# Patient Record
Sex: MALE | Race: WHITE | NOT HISPANIC OR LATINO | URBAN - METROPOLITAN AREA
[De-identification: names, ages, dates, MRNs, and addresses within clinical notes are randomized per-mention and may not be internally consistent; named-entity substitution may affect disease eponyms.]

---

## 2021-07-15 ENCOUNTER — INPATIENT (INPATIENT)
Facility: HOSPITAL | Age: 36
LOS: 1 days | Discharge: HOME | DRG: 603 | End: 2021-07-17
Attending: STUDENT IN AN ORGANIZED HEALTH CARE EDUCATION/TRAINING PROGRAM | Admitting: HOSPITALIST
Payer: COMMERCIAL

## 2021-07-15 VITALS
WEIGHT: 199.96 LBS | TEMPERATURE: 98 F | SYSTOLIC BLOOD PRESSURE: 122 MMHG | RESPIRATION RATE: 16 BRPM | HEART RATE: 75 BPM | OXYGEN SATURATION: 100 % | DIASTOLIC BLOOD PRESSURE: 78 MMHG

## 2021-07-15 LAB
ALBUMIN SERPL ELPH-MCNC: 4.5 G/DL — SIGNIFICANT CHANGE UP (ref 3.3–5)
ALP SERPL-CCNC: 78 U/L — SIGNIFICANT CHANGE UP (ref 40–120)
ALT FLD-CCNC: 47 U/L — HIGH (ref 10–45)
ANION GAP SERPL CALC-SCNC: 15 MMOL/L — SIGNIFICANT CHANGE UP (ref 5–17)
AST SERPL-CCNC: 34 U/L — SIGNIFICANT CHANGE UP (ref 10–40)
BASOPHILS # BLD AUTO: 0.04 K/UL — SIGNIFICANT CHANGE UP (ref 0–0.2)
BASOPHILS NFR BLD AUTO: 0.5 % — SIGNIFICANT CHANGE UP (ref 0–2)
BILIRUB SERPL-MCNC: 0.7 MG/DL — SIGNIFICANT CHANGE UP (ref 0.2–1.2)
BUN SERPL-MCNC: 15 MG/DL — SIGNIFICANT CHANGE UP (ref 7–23)
CALCIUM SERPL-MCNC: 9.7 MG/DL — SIGNIFICANT CHANGE UP (ref 8.4–10.5)
CHLORIDE SERPL-SCNC: 102 MMOL/L — SIGNIFICANT CHANGE UP (ref 96–108)
CO2 SERPL-SCNC: 24 MMOL/L — SIGNIFICANT CHANGE UP (ref 22–31)
CREAT SERPL-MCNC: 1.05 MG/DL — SIGNIFICANT CHANGE UP (ref 0.5–1.3)
EOSINOPHIL # BLD AUTO: 0.16 K/UL — SIGNIFICANT CHANGE UP (ref 0–0.5)
EOSINOPHIL NFR BLD AUTO: 1.8 % — SIGNIFICANT CHANGE UP (ref 0–6)
GLUCOSE SERPL-MCNC: 94 MG/DL — SIGNIFICANT CHANGE UP (ref 70–99)
HCT VFR BLD CALC: 44.5 % — SIGNIFICANT CHANGE UP (ref 39–50)
HGB BLD-MCNC: 15.1 G/DL — SIGNIFICANT CHANGE UP (ref 13–17)
IMM GRANULOCYTES NFR BLD AUTO: 0.8 % — SIGNIFICANT CHANGE UP (ref 0–1.5)
LACTATE SERPL-SCNC: 1.4 MMOL/L — SIGNIFICANT CHANGE UP (ref 0.5–2)
LYMPHOCYTES # BLD AUTO: 1.96 K/UL — SIGNIFICANT CHANGE UP (ref 1–3.3)
LYMPHOCYTES # BLD AUTO: 22.3 % — SIGNIFICANT CHANGE UP (ref 13–44)
MCHC RBC-ENTMCNC: 30.9 PG — SIGNIFICANT CHANGE UP (ref 27–34)
MCHC RBC-ENTMCNC: 33.9 GM/DL — SIGNIFICANT CHANGE UP (ref 32–36)
MCV RBC AUTO: 91.2 FL — SIGNIFICANT CHANGE UP (ref 80–100)
MONOCYTES # BLD AUTO: 0.63 K/UL — SIGNIFICANT CHANGE UP (ref 0–0.9)
MONOCYTES NFR BLD AUTO: 7.2 % — SIGNIFICANT CHANGE UP (ref 2–14)
NEUTROPHILS # BLD AUTO: 5.92 K/UL — SIGNIFICANT CHANGE UP (ref 1.8–7.4)
NEUTROPHILS NFR BLD AUTO: 67.4 % — SIGNIFICANT CHANGE UP (ref 43–77)
NRBC # BLD: 0 /100 WBCS — SIGNIFICANT CHANGE UP (ref 0–0)
PLATELET # BLD AUTO: 328 K/UL — SIGNIFICANT CHANGE UP (ref 150–400)
POTASSIUM SERPL-MCNC: 4.2 MMOL/L — SIGNIFICANT CHANGE UP (ref 3.5–5.3)
POTASSIUM SERPL-SCNC: 4.2 MMOL/L — SIGNIFICANT CHANGE UP (ref 3.5–5.3)
PROT SERPL-MCNC: 8.6 G/DL — HIGH (ref 6–8.3)
RBC # BLD: 4.88 M/UL — SIGNIFICANT CHANGE UP (ref 4.2–5.8)
RBC # FLD: 11.9 % — SIGNIFICANT CHANGE UP (ref 10.3–14.5)
SARS-COV-2 RNA SPEC QL NAA+PROBE: NEGATIVE — SIGNIFICANT CHANGE UP
SODIUM SERPL-SCNC: 141 MMOL/L — SIGNIFICANT CHANGE UP (ref 135–145)
WBC # BLD: 8.78 K/UL — SIGNIFICANT CHANGE UP (ref 3.8–10.5)
WBC # FLD AUTO: 8.78 K/UL — SIGNIFICANT CHANGE UP (ref 3.8–10.5)

## 2021-07-15 PROCEDURE — 99223 1ST HOSP IP/OBS HIGH 75: CPT | Mod: GC

## 2021-07-15 PROCEDURE — 73080 X-RAY EXAM OF ELBOW: CPT | Mod: 26,LT

## 2021-07-15 PROCEDURE — 99285 EMERGENCY DEPT VISIT HI MDM: CPT

## 2021-07-15 RX ORDER — ACETAMINOPHEN 500 MG
650 TABLET ORAL EVERY 6 HOURS
Refills: 0 | Status: DISCONTINUED | OUTPATIENT
Start: 2021-07-15 | End: 2021-07-17

## 2021-07-15 RX ORDER — VANCOMYCIN HCL 1 G
1000 VIAL (EA) INTRAVENOUS ONCE
Refills: 0 | Status: COMPLETED | OUTPATIENT
Start: 2021-07-15 | End: 2021-07-15

## 2021-07-15 RX ADMIN — Medication 250 MILLIGRAM(S): at 20:24

## 2021-07-15 NOTE — H&P ADULT - HISTORY OF PRESENT ILLNESS
Aguila Soria is a 37 yo male with h/o HTN, presented to ER with left arm redness and swelling . Pt reports he had swelling to his left elbow which he noted on Friday which had resolved over the weekend. Patient noted to have redness to his forearm on Monday. Patient stated that his arm did not hurt but started to getting worried about the swelling and redness after which he went to the PMD, after he prescribed Keflex initially than next day Bactrim was add-on since redness significantly increased in size. Pt denies any pain to his left elbow or arm, denies injury to his arm, denies fever or chills, has NROM and normal sensation. Pt was recommended to go to the hospital because despite him taking PO abx. As of now patient denies any fever, chills, Trauma, fall, history of travelling, tick bite,  chest pain , palpitations, nausea, vomiting, abdominal pain, diarrhea, constipation, dysuria or recent history of ill contacts    ED Course  Vitals: T: 97.9F   HR: 75/min    BP: 122/78    RR: 16/min    SPO2: 100% on RA  ED Interventions: Vancomycin 1g IV  Aguila Soria is a 37 yo male with h/o HTN, presented to ER with left arm redness and swelling . Pt reports he had swelling to his left elbow which he noted on Friday which had resolved over the weekend. Patient noted to have redness to his forearm on Monday. Patient stated that his arm did not hurt but started to getting worried about the swelling and redness after which he went to the PMD, after he prescribed Keflex initially than next day Bactrim was add-on since redness significantly increased in size. Pt denies any pain to his left elbow or arm, denies injury to his arm, denies fever or chills, has NROM and normal sensation. Pt was recommended to go to the hospital because despite him taking PO abx. As of now patient denies any fever, chills, Trauma, fall, history of travelling, tick bite,  chest pain , palpitations, nausea, vomiting, abdominal pain, diarrhea, constipation, dysuria or recent history of ill contacts.    ED Course  Vitals: T: 97.9F   HR: 75/min    BP: 122/78    RR: 16/min    SPO2: 100% on RA  ED Interventions: Vancomycin 1g IV  Aguila Soria is a 35 yo male with h/o HTN, presented to ER with left arm redness and swelling . Pt reports he had swelling to his left elbow which he noted on Friday which had resolved over the weekend. Patient noted to have redness to his forearm on Monday. Patient stated that his arm did not hurt but started to getting worried about the swelling and redness after which he went to the PMD, after he prescribed Keflex initially than next day Bactrim was add-on since redness significantly increased in size. Pt denies any pain to his left elbow or arm, denies injury to his arm, denies fever or chills, has NROM and normal sensation. Pt endorses doing planks daily on a yoga mat which he never cleaned.  As of now patient denies any fever, chills, Trauma, fall, history of travelling, tick bite,  chest pain , palpitations, nausea, vomiting, abdominal pain, diarrhea, constipation, dysuria or recent history of ill contacts.    ED Course  Vitals: T: 97.9F   HR: 75/min    BP: 122/78    RR: 16/min    SPO2: 100% on RA  ED Interventions: Vancomycin 1g IV

## 2021-07-15 NOTE — H&P ADULT - ASSESSMENT
Aguila Soria is 35 YO M with PMH of HTN  Aguila Soria is a 35 yo male with h/o HTN, presented to ER with left arm redness and swelling . Pt reports he had swelling to his left elbow which he noted on Friday which had resolved over the weekend. Patient noted to have redness to his forearm on Monday which gradually worsened over time. Patient is vitally stable with no elevation of wbc as of now however has elevated levels of ESR and CRP and is being admitted to general medical floor for cellulitis. Aguila Soria is a 37 yo male with h/o HTN, presented to ER with left arm redness and swelling . Pt reports he had swelling to his left elbow which he noted on Friday which had resolved over the weekend. Patient noted to have redness to his forearm on Monday which gradually worsened over time. Patient is vitally stable with no elevation of wbc as of now however has elevated levels of ESR and CRP and is being admitted to general medical floor. Patient endorsed doing planks on yoga mat which he stated he did not clean and could be possible source of his symptoms.

## 2021-07-15 NOTE — ED PROVIDER NOTE - CLINICAL SUMMARY MEDICAL DECISION MAKING FREE TEXT BOX
37 yo male with h/o HTN, presented to ER with left arm redness and swelling . Pt reports he had swelling to his left elbow which he noted on Friday which had resolved over the weekend. Patient noted to have redness to his forearm on Monday which gradually worsened over time. Patient is afebrile, VSS, has elevated ESR and CRP and no leukocytosis. Pt has NROM to his  left elbow and no elbow joint tenderness on exam. Will admit for IV abx , since symptoms become worse despite pt taking PO abx.

## 2021-07-15 NOTE — H&P ADULT - NSHPPHYSICALEXAM_GEN_ALL_CORE
VITAL SIGNS:  T(C): 36.7 (07-15-21 @ 23:54), Max: 36.8 (07-15-21 @ 22:34)  HR: 68 (07-15-21 @ 23:54) (68 - 88)  BP: 120/75 (07-15-21 @ 23:54) (119/67 - 122/78)  RR: 18 (07-15-21 @ 23:54) (16 - 18)  SpO2: 97% (07-15-21 @ 23:54) (97% - 100%)    PHYSICAL EXAM:  Constitutional: WDWN, comfortable in bed; NAD  Neurologic: AAOx3; CNII-XII grossly intact; no focal deficits  HEENT: NC/AT; clear conjunctiva, anicteric sclera; no nasal discharge; no oropharyngeal erythema or exudates, MMM  Neck: supple; no JVD or thyromegaly, no cervical, post-auricular or supraclavicular lymphadenopathy  Respiratory: CTA B/L, no wheezes/rales/rhonchi, no diminished breath sounds, no increased work of breathing or accessory muscle use  Cardiac: +S1/S2, no murmurs/rubs/gallops, RRR  Gastrointestinal: abdomen soft, NT/ND; no rebound or guarding, no hepatosplenomegaly; +BSx4  Genitourinary: no suprapubic tenderness, no CVA tenderness b/l  Back: spine midline, no bony tenderness or step-offs  Extremities: WWP, no clubbing or cyanosis; no peripheral edema b/l  Musculoskeletal: NROM x4; no joint swelling, tenderness or erythema  Vascular: 2+ radial, femoral, DP/PT pulses B/L  Dermatologic: L forearm redness and swelling noted, skin warm, dry and intact; no rashes, wounds, or scars  Lymphatic: no submandibular or cervical LAD  Psychiatric: affect and characteristics of appearance, verbalizations, behaviors are appropriate VITAL SIGNS:  T(C): 36.7 (07-15-21 @ 23:54), Max: 36.8 (07-15-21 @ 22:34)  HR: 68 (07-15-21 @ 23:54) (68 - 88)  BP: 120/75 (07-15-21 @ 23:54) (119/67 - 122/78)  RR: 18 (07-15-21 @ 23:54) (16 - 18)  SpO2: 97% (07-15-21 @ 23:54) (97% - 100%)    PHYSICAL EXAM:  Constitutional: WDWN, comfortable in bed; NAD  Neurologic: AAOx3; CNII-XII grossly intact; no focal deficits  HEENT: NC/AT; clear conjunctiva, anicteric sclera; no nasal discharge; no oropharyngeal erythema or exudates, MMM  Neck: supple; no JVD or thyromegaly, no cervical, post-auricular or supraclavicular lymphadenopathy  Respiratory: CTA B/L, no wheezes/rales/rhonchi, no diminished breath sounds, no increased work of breathing or accessory muscle use  Cardiac: +S1/S2, no murmurs/rubs/gallops, RRR  Gastrointestinal: abdomen soft, NT/ND; no rebound or guarding, no hepatosplenomegaly; +BSx4  Genitourinary: no suprapubic tenderness, no CVA tenderness b/l  Back: spine midline, no bony tenderness or step-offs  Extremities: WWP, no clubbing or cyanosis; no peripheral edema b/l  Musculoskeletal: NROM x4; no joint swelling, tenderness or erythema  Vascular: 2+ radial, femoral, DP/PT pulses B/L  Dermatologic: L forearm redness and swelling noted with abrasion over the left elbow, skin warm, dry and intact; no rashes, wounds, or scars  Lymphatic: no submandibular or cervical LAD  Psychiatric: affect and characteristics of appearance, verbalizations, behaviors are appropriate

## 2021-07-15 NOTE — ED ADULT NURSE NOTE - OBJECTIVE STATEMENT
pt is 36y male, here for LT arm swelling and redness since Sunday, reports it started as a "bump" on his elbow and then spread out on his arm, denies any fevers, n/v/d, started on abx on Monday with no improvement, LT arm with marked redness and some swelling but no open wounds or drainage, NAD present

## 2021-07-15 NOTE — H&P ADULT - NSHPLABSRESULTS_GEN_ALL_CORE
LABS:                        15.1   8.78  )-----------( 328      ( 15 Jul 2021 20:22 )             44.5     07-15    141  |  102  |  15  ----------------------------<  94  4.2   |  24  |  1.05    Ca    9.7      15 Jul 2021 20:22    TPro  8.6<H>  /  Alb  4.5  /  TBili  0.7  /  DBili  x   /  AST  34  /  ALT  47<H>  /  AlkPhos  78  07-15        CAPILLARY BLOOD GLUCOSE                              I & O Summary:      Microbiology:        RADIOLOGY, EKG AND ADDITIONAL TESTS: Reviewed.

## 2021-07-15 NOTE — ED PROVIDER NOTE - SKIN, MLM
Skin normal color for race, warm, dry and intact. No evidence of rash. left forearm erythema, increased warmth to touch extending above the elbow joint, and beyond surgical pen markings placed yesterday

## 2021-07-15 NOTE — ED PROVIDER NOTE - INTERNATIONAL TRAVEL
SOB may be due to acute COPD exacerbation   Solumedrol ordered - likely start tapering tomorrow.   inhalers ordered - pulmicort/ duoneb No active symptoms.  Currently with paced rhythm making ECG interpretation not helpful. Continue antiplatelet therapy. SOB may be due to exertion vs COPD.   Taper steroids slowly.  inhalers ordered - pulmicort/ duoneb  Will get extra nebulizer now and rest.  If no improvement after lunch will repeat CXR. Stable; continue antiplatelet therapy. Taper steroids slowly.  inhalers as ordered - pulmicort/ duoneb No No active symptoms.  Currently with paced rhythm making ECG interpretation not helpful. Continue antiplatlet therapy.

## 2021-07-15 NOTE — H&P ADULT - ATTENDING COMMENTS
A 35yo M with no significant PMH presents with pain/swelling over left elbow w/ overlying erythema that has progressed down arm for 4-5days    #Celultis vs septic bursitis: seen by PCP and prescribed Keflex 4 days ago and switched to Bactrim next day; now with increased erythema down arm. No fevers/chills; no leukocytosis, mild ESR/CRP. s/p vancomycin 1gram in ED. Ortho consulted; however likely no aspiration needed. No RF to MRSA. c/w cefazolin for now A 35yo M with no significant PMH presents with pain/swelling over left elbow w/ overlying erythema that has progressed down arm for 4-5days    #Celultis vs septic bursitis: seen by PCP and prescribed Keflex 4 days ago and switched to Bactrim next day; now with increased erythema down arm. No fevers/chills; no leukocytosis, mild ESR/CRP. s/p vancomycin 1gram in ED. Ortho consulted; No RF to MRSA. c/w cefazolin for now A 35yo M with no significant PMH presents with pain/swelling over left elbow w/ overlying erythema that has progressed down arm for 4-5days    #Celultis vs septic bursitis: seen by PCP and prescribed Keflex 4 days ago and switched to Bactrim next day; now with increased erythema down arm. No fevers/chills; no leukocytosis, mild ESR/CRP. s/p vancomycin 1gram in ED. f/up ortho recs; No RF to MRSA. c/w cefazolin for now

## 2021-07-15 NOTE — ED PROVIDER NOTE - OBJECTIVE STATEMENT
35 yo male with h/o HTN, present to ER with left arm cellulitis. Pt reports he had swelling to his left elbow last week which had resolved over the weekend. Pt noted redness to his forearm on Monday. Seen by PMD and prescribed Keflex initially than next day Bactrim was add-on since redness significantly increased in size. Pt denies any pain to his left elbow or arm, denies injury to his arm, denies fever or chills, has NROM and normal sensation. Pt was recommended to go to the hospital because despite him taking PO abx cellulitis has been significantly worsen.

## 2021-07-15 NOTE — H&P ADULT - PROBLEM SELECTOR PLAN 1
VSS  Elevated ESR, CRP  Plan:   - If febrile consider septic WYMAN   - c/w Clindamycin   - f/u Xray of the L forearm VSS  Elevated ESR, CRP  Plan:   - c/w Clindamycin   - f/u Blood cultures  - f/u Xray of the L forearm VSS  Elevated ESR, CRP  Unclear if septic bursitis or septic bursitis associated with cellulitis  Was prescribed Keflex and bactrim by PMD but took only total of 2-3 doses  Plan:   - c/w Cefazolin 500mg IV q8   - f/u Blood cultures  - f/u Xray of the L forearm VSS  Elevated ESR, CRP  Unclear if septic bursitis or septic bursitis associated with cellulitis  Was prescribed Keflex and bactrim by PMD but took only total of 2-3 doses  Plan:   - c/w Cefazolin 500mg IV q8   - f/u Blood cultures  - f/u Xray of the L forearm  - Ortho consulted, f/u recs

## 2021-07-15 NOTE — ED ADULT TRIAGE NOTE - CHIEF COMPLAINT QUOTE
pt complaining of left arm pain redness streaking up the arm since last Friday, started Keflex on Monday noted skin pen marking redness appears outside lines drawn

## 2021-07-15 NOTE — H&P ADULT - PROBLEM SELECTOR PLAN 3
F: None  E: replete to K>4, Mg>2, Phos>2.5  N: Regular diet   Ppx: none, ambulates     Code Status: Full     Dispo:  Regional medical floor

## 2021-07-16 DIAGNOSIS — R63.8 OTHER SYMPTOMS AND SIGNS CONCERNING FOOD AND FLUID INTAKE: ICD-10-CM

## 2021-07-16 DIAGNOSIS — L03.114 CELLULITIS OF LEFT UPPER LIMB: ICD-10-CM

## 2021-07-16 DIAGNOSIS — I10 ESSENTIAL (PRIMARY) HYPERTENSION: ICD-10-CM

## 2021-07-16 LAB
ALBUMIN SERPL ELPH-MCNC: 4.3 G/DL — SIGNIFICANT CHANGE UP (ref 3.3–5)
ALP SERPL-CCNC: 81 U/L — SIGNIFICANT CHANGE UP (ref 40–120)
ALT FLD-CCNC: 45 U/L — SIGNIFICANT CHANGE UP (ref 10–45)
ANION GAP SERPL CALC-SCNC: 10 MMOL/L — SIGNIFICANT CHANGE UP (ref 5–17)
AST SERPL-CCNC: 31 U/L — SIGNIFICANT CHANGE UP (ref 10–40)
BASOPHILS # BLD AUTO: 0.04 K/UL — SIGNIFICANT CHANGE UP (ref 0–0.2)
BASOPHILS NFR BLD AUTO: 0.5 % — SIGNIFICANT CHANGE UP (ref 0–2)
BILIRUB SERPL-MCNC: 0.9 MG/DL — SIGNIFICANT CHANGE UP (ref 0.2–1.2)
BUN SERPL-MCNC: 12 MG/DL — SIGNIFICANT CHANGE UP (ref 7–23)
CALCIUM SERPL-MCNC: 10.1 MG/DL — SIGNIFICANT CHANGE UP (ref 8.4–10.5)
CHLORIDE SERPL-SCNC: 101 MMOL/L — SIGNIFICANT CHANGE UP (ref 96–108)
CO2 SERPL-SCNC: 27 MMOL/L — SIGNIFICANT CHANGE UP (ref 22–31)
CREAT SERPL-MCNC: 1.05 MG/DL — SIGNIFICANT CHANGE UP (ref 0.5–1.3)
EOSINOPHIL # BLD AUTO: 0.15 K/UL — SIGNIFICANT CHANGE UP (ref 0–0.5)
EOSINOPHIL NFR BLD AUTO: 2.1 % — SIGNIFICANT CHANGE UP (ref 0–6)
GLUCOSE SERPL-MCNC: 91 MG/DL — SIGNIFICANT CHANGE UP (ref 70–99)
HCT VFR BLD CALC: 46.2 % — SIGNIFICANT CHANGE UP (ref 39–50)
HGB BLD-MCNC: 15 G/DL — SIGNIFICANT CHANGE UP (ref 13–17)
IMM GRANULOCYTES NFR BLD AUTO: 1 % — SIGNIFICANT CHANGE UP (ref 0–1.5)
LYMPHOCYTES # BLD AUTO: 1.74 K/UL — SIGNIFICANT CHANGE UP (ref 1–3.3)
LYMPHOCYTES # BLD AUTO: 23.9 % — SIGNIFICANT CHANGE UP (ref 13–44)
MCHC RBC-ENTMCNC: 29.8 PG — SIGNIFICANT CHANGE UP (ref 27–34)
MCHC RBC-ENTMCNC: 32.5 GM/DL — SIGNIFICANT CHANGE UP (ref 32–36)
MCV RBC AUTO: 91.7 FL — SIGNIFICANT CHANGE UP (ref 80–100)
MONOCYTES # BLD AUTO: 0.7 K/UL — SIGNIFICANT CHANGE UP (ref 0–0.9)
MONOCYTES NFR BLD AUTO: 9.6 % — SIGNIFICANT CHANGE UP (ref 2–14)
NEUTROPHILS # BLD AUTO: 4.58 K/UL — SIGNIFICANT CHANGE UP (ref 1.8–7.4)
NEUTROPHILS NFR BLD AUTO: 62.9 % — SIGNIFICANT CHANGE UP (ref 43–77)
NRBC # BLD: 0 /100 WBCS — SIGNIFICANT CHANGE UP (ref 0–0)
PLATELET # BLD AUTO: 358 K/UL — SIGNIFICANT CHANGE UP (ref 150–400)
POTASSIUM SERPL-MCNC: 4.4 MMOL/L — SIGNIFICANT CHANGE UP (ref 3.5–5.3)
POTASSIUM SERPL-SCNC: 4.4 MMOL/L — SIGNIFICANT CHANGE UP (ref 3.5–5.3)
PROT SERPL-MCNC: 8 G/DL — SIGNIFICANT CHANGE UP (ref 6–8.3)
RBC # BLD: 5.04 M/UL — SIGNIFICANT CHANGE UP (ref 4.2–5.8)
RBC # FLD: 12 % — SIGNIFICANT CHANGE UP (ref 10.3–14.5)
SODIUM SERPL-SCNC: 138 MMOL/L — SIGNIFICANT CHANGE UP (ref 135–145)
WBC # BLD: 7.28 K/UL — SIGNIFICANT CHANGE UP (ref 3.8–10.5)
WBC # FLD AUTO: 7.28 K/UL — SIGNIFICANT CHANGE UP (ref 3.8–10.5)

## 2021-07-16 PROCEDURE — 99233 SBSQ HOSP IP/OBS HIGH 50: CPT | Mod: GC

## 2021-07-16 RX ORDER — CEFAZOLIN SODIUM 1 G
500 VIAL (EA) INJECTION EVERY 8 HOURS
Refills: 0 | Status: DISCONTINUED | OUTPATIENT
Start: 2021-07-16 | End: 2021-07-17

## 2021-07-16 RX ORDER — LISINOPRIL 2.5 MG/1
20 TABLET ORAL DAILY
Refills: 0 | Status: DISCONTINUED | OUTPATIENT
Start: 2021-07-16 | End: 2021-07-17

## 2021-07-16 RX ADMIN — Medication 100 MILLIGRAM(S): at 06:26

## 2021-07-16 RX ADMIN — Medication 100 MILLIGRAM(S): at 14:28

## 2021-07-16 RX ADMIN — Medication 100 MILLIGRAM(S): at 22:05

## 2021-07-16 RX ADMIN — LISINOPRIL 20 MILLIGRAM(S): 2.5 TABLET ORAL at 09:22

## 2021-07-16 NOTE — CONSULT NOTE ADULT - SUBJECTIVE AND OBJECTIVE BOX
Orthopaedic Surgery Consult Note    For Surgeon: Dr. Bird    HPI:  Aguila Soria is a 37 yo male with h/o HTN, presented to ER with left arm redness and swelling . Pt reports he had swelling to his left elbow which he noted on Friday which had resolved over the weekend. Patient noted to have redness to his forearm on Monday. Patient stated that his arm did not hurt but started to getting worried about the swelling and redness after which he went to the PMD, after he prescribed Keflex initially than next day Bactrim was add-on since redness significantly increased in size. Pt denies any pain to his left elbow or arm, denies injury to his arm, denies fever or chills, has NROM and normal sensation. Pt endorses doing planks daily on a yoga mat which he never cleaned.  As of now patient denies any fever, chills, Trauma, fall, history of travelling, tick bite,  chest pain , palpitations, nausea, vomiting, abdominal pain, diarrhea, constipation, dysuria or recent history of ill contacts.    Ortho consulted to evaluate for septic bursitis vs. cellulitis vs. septic arthritis    Allergies    No Known Allergies    Intolerances      PAST MEDICAL & SURGICAL HISTORY:  Hypertension      MEDICATIONS  (STANDING):  ceFAZolin   IVPB 500 milliGRAM(s) IV Intermittent every 8 hours  lisinopril 20 milliGRAM(s) Oral daily    MEDICATIONS  (PRN):  acetaminophen   Tablet .. 650 milliGRAM(s) Oral every 6 hours PRN Temp greater or equal to 38.5C (101.3F), Mild Pain (1 - 3)      Vital Signs Last 24 Hrs  T(C): 36.7 (15 Jul 2021 23:54), Max: 36.8 (15 Jul 2021 22:34)  T(F): 98.1 (15 Jul 2021 23:54), Max: 98.2 (15 Jul 2021 22:34)  HR: 68 (15 Jul 2021 23:54) (68 - 88)  BP: 120/75 (15 Jul 2021 23:54) (119/67 - 122/78)  BP(mean): --  RR: 18 (15 Jul 2021 23:54) (16 - 18)  SpO2: 97% (15 Jul 2021 23:54) (97% - 100%)    Physical Exam:  General: AAOx3, NAD  LUE: mild swelling and erythema from distal arm to mid forearm  skin blanching  Minimally TTP diffusely  Swollen but easily compressible  Olecranon bursa distended  Motor 5/5 AIN/PIN/U  SILT M/R/U  Radial pulse 2+  Full ROM of elbow in all planes without pain                         15.1   8.78  )-----------( 328      ( 15 Jul 2021 20:22 )             44.5     07-15    141  |  102  |  15  ----------------------------<  94  4.2   |  24  |  1.05    Ca    9.7      15 Jul 2021 20:22    TPro  8.6<H>  /  Alb  4.5  /  TBili  0.7  /  DBili  x   /  AST  34  /  ALT  47<H>  /  AlkPhos  78  07-15      A/P: 36yMale with L forearm cellulitis and olecranon bursitis   - No acute surgical intervention at this time  - Continue ancef as per medicine team  - Can consider increasing to unasyn if symptoms not improving  - NSAIDs for bursitis  - Low suspicion for septic elbow at this time, no indication for arthrocentesis    Ian Maldonado, PGY-2  Orthopedic Surgery   Orthopaedic Surgery Consult Note    For Surgeon: Dr. Bird    HPI:  Aguila Soria is a 35 yo male with h/o HTN, presented to ER with left arm redness and swelling . Pt reports he had swelling to his left elbow which he noted on Friday which had resolved over the weekend. Patient noted to have redness to his forearm on Monday. Patient stated that his arm did not hurt but started to getting worried about the swelling and redness after which he went to the PMD, after he prescribed Keflex initially than next day Bactrim was add-on since redness significantly increased in size. Pt denies any pain to his left elbow or arm, denies injury to his arm, denies fever or chills, has NROM and normal sensation. Pt endorses doing planks daily on a yoga mat which he never cleaned.  As of now patient denies any fever, chills, Trauma, fall, history of travelling, tick bite,  chest pain , palpitations, nausea, vomiting, abdominal pain, diarrhea, constipation, dysuria or recent history of ill contacts.    Ortho consulted to evaluate for septic bursitis vs. cellulitis vs. septic arthritis    Allergies    No Known Allergies    Intolerances      PAST MEDICAL & SURGICAL HISTORY:  Hypertension      MEDICATIONS  (STANDING):  ceFAZolin   IVPB 500 milliGRAM(s) IV Intermittent every 8 hours  lisinopril 20 milliGRAM(s) Oral daily    MEDICATIONS  (PRN):  acetaminophen   Tablet .. 650 milliGRAM(s) Oral every 6 hours PRN Temp greater or equal to 38.5C (101.3F), Mild Pain (1 - 3)      Vital Signs Last 24 Hrs  T(C): 36.7 (15 Jul 2021 23:54), Max: 36.8 (15 Jul 2021 22:34)  T(F): 98.1 (15 Jul 2021 23:54), Max: 98.2 (15 Jul 2021 22:34)  HR: 68 (15 Jul 2021 23:54) (68 - 88)  BP: 120/75 (15 Jul 2021 23:54) (119/67 - 122/78)  BP(mean): --  RR: 18 (15 Jul 2021 23:54) (16 - 18)  SpO2: 97% (15 Jul 2021 23:54) (97% - 100%)    Physical Exam:  General: AAOx3, NAD  LUE: mild swelling and erythema from distal arm to mid forearm  skin blanching  Minimally TTP diffusely  Swollen but easily compressible  Olecranon bursa distended  Motor 5/5 AIN/PIN/U  SILT M/R/U  Radial pulse 2+  Full ROM of elbow in all planes without pain                         15.1   8.78  )-----------( 328      ( 15 Jul 2021 20:22 )             44.5     07-15    141  |  102  |  15  ----------------------------<  94  4.2   |  24  |  1.05    Ca    9.7      15 Jul 2021 20:22    TPro  8.6<H>  /  Alb  4.5  /  TBili  0.7  /  DBili  x   /  AST  34  /  ALT  47<H>  /  AlkPhos  78  07-15      A/P: 36yMale with L forearm cellulitis and olecranon bursitis   - No acute surgical intervention at this time  - Continue ancef as per medicine team  - Can consider increasing to unasyn if symptoms not improving  - NSAIDs for bursitis  - Low suspicion for septic elbow at this time, no indication for arthrocentesis  - Discussed w/Dr. Marge Maldonado, PGY-2  Orthopedic Surgery

## 2021-07-16 NOTE — PROGRESS NOTE ADULT - SUBJECTIVE AND OBJECTIVE BOX
INTERVAL HPI/OVERNIGHT EVENTS:  ***  Patient was seen and examined at bedside.   No o/n events, patient resting comfortably. No complaints at this time. Patient denies: fever, chills, dizziness, weakness, HA, Changes in vision, CP, palpitations, SOB, cough, N/V/D/C, dysuria, changes in bowel movements, LE edema. ROS otherwise negative.    VITALS:  T(F): 98.1 (07-15-21 @ 23:54)  HR: 68 (07-15-21 @ 23:54)  BP: 120/75 (07-15-21 @ 23:54)  RR: 18 (07-15-21 @ 23:54)  SpO2: 97% (07-15-21 @ 23:54)  Wt(kg): --    PHYSICAL EXAM:    MEDICATIONS  (STANDING):  ceFAZolin   IVPB 500 milliGRAM(s) IV Intermittent every 8 hours  lisinopril 20 milliGRAM(s) Oral daily    MEDICATIONS  (PRN):  acetaminophen   Tablet .. 650 milliGRAM(s) Oral every 6 hours PRN Temp greater or equal to 38.5C (101.3F), Mild Pain (1 - 3)    Allergies    No Known Allergies    Intolerances      LABS:                        15.1   8.78  )-----------( 328      ( 15 Jul 2021 20:22 )             44.5     07-15    141  |  102  |  15  ----------------------------<  94  4.2   |  24  |  1.05    Ca    9.7      15 Jul 2021 20:22    TPro  8.6<H>  /  Alb  4.5  /  TBili  0.7  /  DBili  x   /  AST  34  /  ALT  47<H>  /  AlkPhos  78  07-15    Sedimentation Rate, Erythrocyte: 57 mm/Hr (07.15.21 @ 20:22)   C-Reactive Protein, Serum: 45.9 mg/L (07.15.21 @ 20:22)                INTERVAL HPI/OVERNIGHT EVENTS:    Patient was seen and examined at bedside.   No o/n events, patient resting comfortably. Erythema and swelling has improved. Patient denies: fever, chills, dizziness, weakness, HA, Changes in vision, CP, palpitations, SOB, cough, N/V/D/C, dysuria, changes in bowel movements, LE edema. ROS otherwise negative.    VITALS:  T(F): 98.1 (07-15-21 @ 23:54)  HR: 68 (07-15-21 @ 23:54)  BP: 120/75 (07-15-21 @ 23:54)  RR: 18 (07-15-21 @ 23:54)  SpO2: 97% (07-15-21 @ 23:54)  Wt(kg): --    PHYSICAL EXAM:  Gen: NAD, laying in bed, well appearing, alert, interactive  HEENT: PERRL, anicteric sclera, no JVD, no thyromegaly  Cardio: +S1/S2, RRR, no murmurs  Resp: CTA b/l, no w/r/r  GI: +BS x4, NT/ND  Ext: no peripheral edema, NROM x4  Vasc: 3+ peripheral pulses  Skin: faint blanching erythema to the lateral aspect of the arm and forearm, extending from the elbow  MSK: cystic projection from L elbow, NROM of all extremities  Neuro: AAOx3, CN II-XII intact, no focal deficits    MEDICATIONS  (STANDING):  ceFAZolin   IVPB 500 milliGRAM(s) IV Intermittent every 8 hours  lisinopril 20 milliGRAM(s) Oral daily    MEDICATIONS  (PRN):  acetaminophen   Tablet .. 650 milliGRAM(s) Oral every 6 hours PRN Temp greater or equal to 38.5C (101.3F), Mild Pain (1 - 3)    Allergies    No Known Allergies    Intolerances      LABS:                        15.1   8.78  )-----------( 328      ( 15 Jul 2021 20:22 )             44.5     07-15    141  |  102  |  15  ----------------------------<  94  4.2   |  24  |  1.05    Ca    9.7      15 Jul 2021 20:22    TPro  8.6<H>  /  Alb  4.5  /  TBili  0.7  /  DBili  x   /  AST  34  /  ALT  47<H>  /  AlkPhos  78  07-15    Sedimentation Rate, Erythrocyte: 57 mm/Hr (07.15.21 @ 20:22)   C-Reactive Protein, Serum: 45.9 mg/L (07.15.21 @ 20:22)

## 2021-07-16 NOTE — DISCHARGE NOTE PROVIDER - CARE PROVIDER_API CALL
Johanny Amador  [address], [place], [place], [address]  Phone: [phone]  Fax: (   )    -  Established Patient  Follow Up Time: 1 week

## 2021-07-16 NOTE — PROGRESS NOTE ADULT - ATTENDING COMMENTS
Pt seen and examined by me with housestaff this AM. Agree with above with additions,   improved LUE cellulitis, +flatulence at L elbow, erythema improved compared to admission.   LUE cellulitis/L olecranon bursitis- low suspicion for septic arthritis, c/w cefazolin till tomorrow. dc with keflex.     rest of a/p as above.

## 2021-07-16 NOTE — DISCHARGE NOTE PROVIDER - NSDCFUADDAPPT_GEN_ALL_CORE_FT
Please schedule an appointment with your PCP within 1 week of discharge to evaluate for resolution of your olecranon bursitis and cellulitis.

## 2021-07-16 NOTE — PROGRESS NOTE ADULT - ASSESSMENT
Aguila Soria is a 35 yo male with h/o HTN, presented to ER with left arm redness and swelling . Pt reports he had swelling to his left elbow which he noted on Friday which had resolved over the weekend. Patient noted to have redness to his forearm on Monday which gradually worsened over time. Patient is vitally stable with no elevation of wbc as of now however has elevated levels of ESR and CRP and is being admitted to general medical floor. Patient endorsed doing planks on yoga mat which he stated he did not clean and could be possible source of his symptoms.

## 2021-07-16 NOTE — DISCHARGE NOTE PROVIDER - PROVIDER TOKENS
FREE:[LAST:[Perry],FIRST:[Johanny],PHONE:[phone]],FAX:[(   [phone]    -],ADDRESS:[[address] [place] [address], [id number]FOLLOWUP:[1 week],ESTABLISHEDPATIENT:[T]]

## 2021-07-16 NOTE — DISCHARGE NOTE PROVIDER - NSDCMRMEDTOKEN_GEN_ALL_CORE_FT
ramipril 5 mg oral capsule: 1 cap(s) orally once a day   acetaminophen 325 mg oral tablet: 2 tab(s) orally every 6 hours, As needed, Temp greater or equal to 38.5C (101.3F), Mild Pain (1 - 3)  ramipril 5 mg oral capsule: 1 cap(s) orally once a day

## 2021-07-16 NOTE — DISCHARGE NOTE PROVIDER - HOSPITAL COURSE
#Discharge: do not delete    Patient is __ yo M/F with past medical history of _____  Presented with _____, found to have _____  Problem List/Main Diagnoses (system-based):   Inpatient treatment course:   New medications:   Labs to be followed outpatient:   Exam to be followed outpatient:    #Discharge: do not delete    Patient is 37 yo M with past medical history of HTN    Presented with left arm redness and swelling, found to have  L forearm cellulitis and olecranon bursitis    Problem List/Main Diagnoses (system-based):     #Cellulitis of left upper extremity.   #Olecranon bursitis  Presented with L arm redness and swelling x 8 days. No improvement with several days of PO Keflex and PO Bactrim provided by MD. Came to ED for worsening redness and swelling. Afebrile and VSS w/o leukocytosis. S/p IV vancomycin in ED. Admitted due to elevated inflammatory markers. IV Ancef given with major improvement in erythema. Ortho recommended no intervention or arthocentesis. IV Abx switched to PO ABx. Considered stable for discharge.      Inpatient treatment course:   New medications:   Labs to be followed outpatient:   Exam to be followed outpatient:    #Discharge: do not delete    Patient is 35 yo M with past medical history of HTN    Presented with left arm redness and swelling, found to have  L forearm cellulitis and olecranon bursitis    Problem List/Main Diagnoses (system-based):     #Cellulitis of left upper extremity.   #Olecranon bursitis  Presented with L arm redness and swelling x 8 days. No improvement with several days of PO Keflex and PO Bactrim provided by MD. Came to ED for worsening redness and swelling. Afebrile and VSS w/o leukocytosis. S/p IV vancomycin in ED. Admitted due to elevated inflammatory markers. IV Ancef given with major improvement in erythema. Ortho recommended no intervention or arthocentesis. IV Abx switched to PO ABx. Considered stable for discharge.    New medications:   - PO Keflex 500 mg q*h x *** days (until ***)    Labs to be followed outpatient: None    Exam to be followed outpatient: PMD   #Discharge: do not delete    Patient is 35 yo M with past medical history of HTN    Presented with left arm redness and swelling, found to have  L forearm cellulitis and olecranon bursitis    Problem List/Main Diagnoses (system-based):     #Cellulitis of left upper extremity.   #Olecranon bursitis  Presented with L arm redness and swelling x 8 days. No improvement with several days of PO Keflex and PO Bactrim provided by MD. Came to ED for worsening redness and swelling. Afebrile and VSS w/o leukocytosis. S/p IV vancomycin in ED. Admitted due to elevated inflammatory markers. IV Ancef given with major improvement in erythema. Ortho recommended no intervention or arthocentesis. Considered stable for discharge on remainder of home Keflex w/ f/u with PMD.    New medications:   - PO Keflex 500 mg daily (complete course of Abx as prescribed by PMD)    Labs to be followed outpatient: None    Exam to be followed outpatient: PMD - patient will schedule follow up appt   #Discharge: do not delete    Patient is 35 yo M with past medical history of HTN    Presented with left arm redness and swelling, found to have  L forearm cellulitis and olecranon bursitis    Problem List/Main Diagnoses (system-based):     #Cellulitis of left upper extremity.   #Olecranon bursitis  Presented with L arm redness and swelling x 8 days. No improvement with several days of PO Keflex and PO Bactrim provided by MD. Came to ED for worsening redness and swelling. Afebrile and VSS w/o leukocytosis. S/p IV vancomycin in ED. Admitted due to elevated inflammatory markers. IV Ancef given with major improvement in erythema. Ortho recommended no intervention or arthocentesis. Considered stable for discharge on remainder of home Keflex w/ f/u with PMD.      Patient was discharge to home.     New medications:   - PO Keflex 500 mg daily (complete course of Abx as prescribed by PMD)    Labs to be followed outpatient: None   Exam to be followed outpatient: PMD - patient will schedule follow up appt, Ortho - patient will schedule FU appointment with Dr. Bird   Physical exam at discharge: Focused exam - LUE, no tenderness, full ROM, resolved erythema, 2+ radial pulse, warm and well perfused. Rest of physical exam is unremarkable.

## 2021-07-16 NOTE — PROGRESS NOTE ADULT - PROBLEM SELECTOR PLAN 3
F: None  E: replete to K>4, Mg>2, Phos>2.5  N: Regular diet   Ppx: none, ambulates     Code Status: Full     Dispo:  [facility] medical floor F: None  E: replete to K>4, Mg>2, Phos>2.5  N: Regular diet   Ppx: none, ambulates     Code Status: Full     Dispo: likely home tomorrow

## 2021-07-16 NOTE — DISCHARGE NOTE PROVIDER - NSDCCPCAREPLAN_GEN_ALL_CORE_FT
PRINCIPAL DISCHARGE DIAGNOSIS  Diagnosis: Olecranon bursitis of left elbow  Assessment and Plan of Treatment: You were admitted for management of olecranon bursitis and cellulitis of the left arm. Olecranon bursitis is a condition in which the bursa, a fluid-filled sac, gets irritated and swollen. A bursa can get infected if a person gets a cut on the skin nearby. This causes symptoms of pain, redness, warmth, and swelling of the elbow. When the skin surrounding your elbow is infected, this is called cellulitis. You have been receiving antibiotics intravenously throughout your hospitalization. After discharge, it is important to taking your Keflex as prescribed and visit your primary care doctor 1 week after discharge. If you notice that the redness, warmth, or pain worsens or that you are unable to flex or extend your elbow, please return to the hospital.       PRINCIPAL DISCHARGE DIAGNOSIS  Diagnosis: Olecranon bursitis of left elbow  Assessment and Plan of Treatment: You were admitted for management of olecranon bursitis and cellulitis of the left arm. Olecranon bursitis is a condition in which the bursa, a fluid-filled sac, gets irritated and swollen. A bursa can get infected if a person gets a cut on the skin nearby. This causes symptoms of pain, redness, warmth, and swelling of the elbow. When the skin surrounding your elbow is infected, this is called cellulitis. You have been receiving antibiotics intravenously throughout your hospitalization. After discharge, it is important to complete the course of Keflex that was prescribed and visit your primary care doctor 1 week after discharge. If you notice that the redness, warmth, or pain worsens or that you are unable to flex or extend your elbow or wrist, please return to the hospital.

## 2021-07-17 VITALS
RESPIRATION RATE: 18 BRPM | SYSTOLIC BLOOD PRESSURE: 127 MMHG | OXYGEN SATURATION: 96 % | HEART RATE: 60 BPM | TEMPERATURE: 98 F | DIASTOLIC BLOOD PRESSURE: 79 MMHG

## 2021-07-17 LAB
ANION GAP SERPL CALC-SCNC: 11 MMOL/L — SIGNIFICANT CHANGE UP (ref 5–17)
BASOPHILS # BLD AUTO: 0.04 K/UL — SIGNIFICANT CHANGE UP (ref 0–0.2)
BASOPHILS NFR BLD AUTO: 0.5 % — SIGNIFICANT CHANGE UP (ref 0–2)
BUN SERPL-MCNC: 15 MG/DL — SIGNIFICANT CHANGE UP (ref 7–23)
CALCIUM SERPL-MCNC: 10 MG/DL — SIGNIFICANT CHANGE UP (ref 8.4–10.5)
CHLORIDE SERPL-SCNC: 100 MMOL/L — SIGNIFICANT CHANGE UP (ref 96–108)
CO2 SERPL-SCNC: 25 MMOL/L — SIGNIFICANT CHANGE UP (ref 22–31)
COVID-19 SPIKE DOMAIN AB INTERP: POSITIVE
COVID-19 SPIKE DOMAIN ANTIBODY RESULT: 122 U/ML — HIGH
CREAT SERPL-MCNC: 0.97 MG/DL — SIGNIFICANT CHANGE UP (ref 0.5–1.3)
EOSINOPHIL # BLD AUTO: 0.16 K/UL — SIGNIFICANT CHANGE UP (ref 0–0.5)
EOSINOPHIL NFR BLD AUTO: 2.2 % — SIGNIFICANT CHANGE UP (ref 0–6)
GLUCOSE SERPL-MCNC: 97 MG/DL — SIGNIFICANT CHANGE UP (ref 70–99)
HCT VFR BLD CALC: 44.5 % — SIGNIFICANT CHANGE UP (ref 39–50)
HGB BLD-MCNC: 15 G/DL — SIGNIFICANT CHANGE UP (ref 13–17)
IMM GRANULOCYTES NFR BLD AUTO: 0.8 % — SIGNIFICANT CHANGE UP (ref 0–1.5)
LYMPHOCYTES # BLD AUTO: 1.88 K/UL — SIGNIFICANT CHANGE UP (ref 1–3.3)
LYMPHOCYTES # BLD AUTO: 25.5 % — SIGNIFICANT CHANGE UP (ref 13–44)
MCHC RBC-ENTMCNC: 31 PG — SIGNIFICANT CHANGE UP (ref 27–34)
MCHC RBC-ENTMCNC: 33.7 GM/DL — SIGNIFICANT CHANGE UP (ref 32–36)
MCV RBC AUTO: 91.9 FL — SIGNIFICANT CHANGE UP (ref 80–100)
MONOCYTES # BLD AUTO: 0.64 K/UL — SIGNIFICANT CHANGE UP (ref 0–0.9)
MONOCYTES NFR BLD AUTO: 8.7 % — SIGNIFICANT CHANGE UP (ref 2–14)
NEUTROPHILS # BLD AUTO: 4.59 K/UL — SIGNIFICANT CHANGE UP (ref 1.8–7.4)
NEUTROPHILS NFR BLD AUTO: 62.3 % — SIGNIFICANT CHANGE UP (ref 43–77)
NRBC # BLD: 0 /100 WBCS — SIGNIFICANT CHANGE UP (ref 0–0)
PLATELET # BLD AUTO: 372 K/UL — SIGNIFICANT CHANGE UP (ref 150–400)
POTASSIUM SERPL-MCNC: 4.2 MMOL/L — SIGNIFICANT CHANGE UP (ref 3.5–5.3)
POTASSIUM SERPL-SCNC: 4.2 MMOL/L — SIGNIFICANT CHANGE UP (ref 3.5–5.3)
RBC # BLD: 4.84 M/UL — SIGNIFICANT CHANGE UP (ref 4.2–5.8)
RBC # FLD: 12 % — SIGNIFICANT CHANGE UP (ref 10.3–14.5)
SARS-COV-2 IGG+IGM SERPL QL IA: 122 U/ML — HIGH
SARS-COV-2 IGG+IGM SERPL QL IA: POSITIVE
SODIUM SERPL-SCNC: 136 MMOL/L — SIGNIFICANT CHANGE UP (ref 135–145)
WBC # BLD: 7.37 K/UL — SIGNIFICANT CHANGE UP (ref 3.8–10.5)
WBC # FLD AUTO: 7.37 K/UL — SIGNIFICANT CHANGE UP (ref 3.8–10.5)

## 2021-07-17 PROCEDURE — 80048 BASIC METABOLIC PNL TOTAL CA: CPT

## 2021-07-17 PROCEDURE — 99285 EMERGENCY DEPT VISIT HI MDM: CPT | Mod: 25

## 2021-07-17 PROCEDURE — 73080 X-RAY EXAM OF ELBOW: CPT

## 2021-07-17 PROCEDURE — 87040 BLOOD CULTURE FOR BACTERIA: CPT

## 2021-07-17 PROCEDURE — 80053 COMPREHEN METABOLIC PANEL: CPT

## 2021-07-17 PROCEDURE — 85025 COMPLETE CBC W/AUTO DIFF WBC: CPT

## 2021-07-17 PROCEDURE — 96374 THER/PROPH/DIAG INJ IV PUSH: CPT

## 2021-07-17 PROCEDURE — 99239 HOSP IP/OBS DSCHRG MGMT >30: CPT | Mod: GC

## 2021-07-17 PROCEDURE — 85652 RBC SED RATE AUTOMATED: CPT

## 2021-07-17 PROCEDURE — 86769 SARS-COV-2 COVID-19 ANTIBODY: CPT

## 2021-07-17 PROCEDURE — 87635 SARS-COV-2 COVID-19 AMP PRB: CPT

## 2021-07-17 PROCEDURE — 86140 C-REACTIVE PROTEIN: CPT

## 2021-07-17 PROCEDURE — 83605 ASSAY OF LACTIC ACID: CPT

## 2021-07-17 PROCEDURE — 36415 COLL VENOUS BLD VENIPUNCTURE: CPT

## 2021-07-17 RX ORDER — ACETAMINOPHEN 500 MG
2 TABLET ORAL
Qty: 0 | Refills: 0 | DISCHARGE
Start: 2021-07-17

## 2021-07-17 RX ORDER — RAMIPRIL 5 MG
1 CAPSULE ORAL
Qty: 0 | Refills: 0 | DISCHARGE

## 2021-07-17 RX ADMIN — Medication 100 MILLIGRAM(S): at 06:07

## 2021-07-17 RX ADMIN — LISINOPRIL 20 MILLIGRAM(S): 2.5 TABLET ORAL at 09:34

## 2021-07-17 RX ADMIN — Medication 100 MILLIGRAM(S): at 13:46

## 2021-07-17 NOTE — PROGRESS NOTE ADULT - SUBJECTIVE AND OBJECTIVE BOX
SUBJECTIVE: Patient seen and examined. Pt doing well o/n.  No f/c/n/v/cp/sob.     OBJECTIVE:  Vital Signs Last 24 Hrs  T(C): 36.4 (17 Jul 2021 08:46), Max: 36.9 (16 Jul 2021 20:52)  T(F): 97.6 (17 Jul 2021 08:46), Max: 98.4 (16 Jul 2021 20:52)  HR: 60 (17 Jul 2021 08:46) (56 - 65)  BP: 127/79 (17 Jul 2021 08:46) (118/68 - 127/79)  BP(mean): --  RR: 18 (17 Jul 2021 08:46) (18 - 18)  SpO2: 96% (17 Jul 2021 08:46) (96% - 100%)    Affected extremity:          Left Forearm: Swelling redness diminishing           Sensation: SILT         Motor exam: 5/5 TA/GS/EHL         warm well perfused; capillary refill <3 seconds     LABS:                        15.0   7.28  )-----------( 358      ( 16 Jul 2021 13:13 )             46.2     07-16    138  |  101  |  12  ----------------------------<  91  4.4   |  27  |  1.05    Ca    10.1      16 Jul 2021 13:13    TPro  8.0  /  Alb  4.3  /  TBili  0.9  /  DBili  x   /  AST  31  /  ALT  45  /  AlkPhos  81  07-16        MEDICATIONS:acetaminophen   Tablet .. 650 milliGRAM(s) Oral every 6 hours PRN  ceFAZolin   IVPB 500 milliGRAM(s) IV Intermittent every 8 hours  lisinopril 20 milliGRAM(s) Oral daily    Anticoagulation:    Antibiotics:   ceFAZolin   IVPB 500 milliGRAM(s) IV Intermittent every 8 hours    Pain medications:   acetaminophen   Tablet .. 650 milliGRAM(s) Oral every 6 hours PRN    A/P :  Pt is a 37yo Male s/p  POD # 2 cellulitis  _ Follow medicine team advice  -    Pain control  -    DVT ppx: ASA   or as suggested by team   -    Weight bearing status: WBAT   -    Physical Therapy  -    Dispo: Home

## 2021-07-17 NOTE — DISCHARGE NOTE NURSING/CASE MANAGEMENT/SOCIAL WORK - PATIENT PORTAL LINK FT
You can access the FollowMyHealth Patient Portal offered by [facility] by registering at the following website: [email or web address]. By joining [employer]’s FollowMyHealth portal, you will also be able to view your health information using other applications (apps) compatible with our system.

## 2021-07-17 NOTE — PROGRESS NOTE ADULT - SUBJECTIVE AND OBJECTIVE BOX
Ortho Note    Pt seen and examined at bedside, pain controlled, swelling and cellulitis improving. Denies CP, SOB, N/V, numbness/tingling     Vital Signs Last 24 Hrs  T(C): 36.4 (07-17-21 @ 08:46), Max: 36.4 (07-17-21 @ 05:36)  T(F): 97.6 (07-17-21 @ 08:46), Max: 97.6 (07-17-21 @ 05:36)  HR: 60 (07-17-21 @ 08:46) (56 - 60)  BP: 127/79 (07-17-21 @ 08:46) (124/75 - 127/79)  BP(mean): --  RR: 18 (07-17-21 @ 08:46) (18 - 18)  SpO2: 96% (07-17-21 @ 08:46) (96% - 98%)  AVSS    General: Pt Alert and oriented, NAD  L elbow and forearm swelling and cellulitis improving   Sensation intact C5-T1  Motor: AIN/PIN/U grossly preserved   2+ rad pulse   Fingers WWP      A/P: 36yMale with L elbow and forearm cellulitis and olecranon bursitis    - Stable and clinically improving on abx  - No acute surgical intervention   - Continue abx  - dispo per primary team  - Out patient follow up with Dr. Bird     Ortho Pager [phone]

## 2021-07-20 LAB
CULTURE RESULTS: SIGNIFICANT CHANGE UP
CULTURE RESULTS: SIGNIFICANT CHANGE UP
SPECIMEN SOURCE: SIGNIFICANT CHANGE UP
SPECIMEN SOURCE: SIGNIFICANT CHANGE UP

## 2021-07-25 DIAGNOSIS — L03.114 CELLULITIS OF LEFT UPPER LIMB: ICD-10-CM

## 2021-07-25 DIAGNOSIS — I10 ESSENTIAL (PRIMARY) HYPERTENSION: ICD-10-CM

## 2021-07-25 DIAGNOSIS — M70.22 OLECRANON BURSITIS, LEFT ELBOW: ICD-10-CM

## 2022-02-23 NOTE — ED PROVIDER NOTE - INPATIENT RESIDENT/ACP NOTIFIED DATE
+Right lower abdominal wall defect with reducible ventral hernia, +mild tenderness over that area, no surrounding skin changes. 15-Jul-2021 21:03

## 2023-10-26 NOTE — PROGRESS NOTE ADULT - PROVIDER SPECIALTY LIST ADULT
Orthopedics
Orthopedics
Internal Medicine
Notification Instructions: Patient will be notified of biopsy results. However, patient instructed to call the office if not contacted within 2 weeks.